# Patient Record
Sex: FEMALE | Race: WHITE | NOT HISPANIC OR LATINO | Employment: PART TIME | URBAN - METROPOLITAN AREA
[De-identification: names, ages, dates, MRNs, and addresses within clinical notes are randomized per-mention and may not be internally consistent; named-entity substitution may affect disease eponyms.]

---

## 2022-09-29 ENCOUNTER — APPOINTMENT (OUTPATIENT)
Dept: WOMENS IMAGING | Facility: HOSPITAL | Age: 67
End: 2022-09-29

## 2022-09-29 ENCOUNTER — TRANSCRIBE ORDERS (OUTPATIENT)
Dept: ADMINISTRATIVE | Facility: HOSPITAL | Age: 67
End: 2022-09-29

## 2022-09-29 DIAGNOSIS — R10.9 ABDOMINAL PAIN, UNSPECIFIED ABDOMINAL LOCATION: Primary | ICD-10-CM

## 2022-09-29 PROCEDURE — 77067 SCR MAMMO BI INCL CAD: CPT | Performed by: RADIOLOGY

## 2022-09-29 PROCEDURE — 77063 BREAST TOMOSYNTHESIS BI: CPT | Performed by: RADIOLOGY

## 2022-10-18 ENCOUNTER — HOSPITAL ENCOUNTER (OUTPATIENT)
Dept: CT IMAGING | Facility: HOSPITAL | Age: 67
Discharge: HOME | End: 2022-10-18
Admitting: OBSTETRICS & GYNECOLOGY

## 2022-10-18 DIAGNOSIS — R10.9 ABDOMINAL PAIN, UNSPECIFIED ABDOMINAL LOCATION: ICD-10-CM

## 2022-10-18 PROCEDURE — 0 DIATRIZOATE MEGLUMINE & SODIUM PER 1 ML: Performed by: OBSTETRICS & GYNECOLOGY

## 2022-10-18 PROCEDURE — 25010000002 IOPAMIDOL 61 % SOLUTION: Performed by: OBSTETRICS & GYNECOLOGY

## 2022-10-18 PROCEDURE — 82565 ASSAY OF CREATININE: CPT

## 2022-10-18 PROCEDURE — 74177 CT ABD & PELVIS W/CONTRAST: CPT

## 2022-10-18 RX ADMIN — IOPAMIDOL 100 ML: 612 INJECTION, SOLUTION INTRAVENOUS at 14:25

## 2022-10-18 RX ADMIN — DIATRIZOATE MEGLUMINE AND DIATRIZOATE SODIUM 30 ML: 660; 100 LIQUID ORAL; RECTAL at 13:15

## 2022-10-19 LAB — CREAT BLDA-MCNC: 0.6 MG/DL (ref 0.6–1.3)

## 2022-10-27 ENCOUNTER — TRANSCRIBE ORDERS (OUTPATIENT)
Dept: ADMINISTRATIVE | Facility: HOSPITAL | Age: 67
End: 2022-10-27

## 2022-10-28 ENCOUNTER — TRANSCRIBE ORDERS (OUTPATIENT)
Dept: ADMINISTRATIVE | Facility: HOSPITAL | Age: 67
End: 2022-10-28

## 2022-10-28 DIAGNOSIS — R93.5 ABNORMAL CT OF THE ABDOMEN: Primary | ICD-10-CM

## 2022-10-28 DIAGNOSIS — K86.2 CYST OF PANCREAS: ICD-10-CM

## 2022-10-28 DIAGNOSIS — C25.0 MALIGNANT NEOPLASM OF HEAD OF PANCREAS: ICD-10-CM

## 2022-10-28 DIAGNOSIS — C25.9 MALIGNANT NEOPLASM OF PANCREAS, UNSPECIFIED LOCATION OF MALIGNANCY: ICD-10-CM

## 2022-11-20 ENCOUNTER — HOSPITAL ENCOUNTER (OUTPATIENT)
Dept: MRI IMAGING | Facility: HOSPITAL | Age: 67
End: 2022-11-20

## 2022-12-05 ENCOUNTER — HOSPITAL ENCOUNTER (OUTPATIENT)
Dept: MRI IMAGING | Facility: HOSPITAL | Age: 67
Discharge: HOME | End: 2022-12-05
Admitting: INTERNAL MEDICINE

## 2022-12-05 DIAGNOSIS — C25.9 MALIGNANT NEOPLASM OF PANCREAS, UNSPECIFIED LOCATION OF MALIGNANCY: ICD-10-CM

## 2022-12-05 PROCEDURE — 0 GADOBENATE DIMEGLUMINE 529 MG/ML SOLUTION: Performed by: INTERNAL MEDICINE

## 2022-12-05 PROCEDURE — A9577 INJ MULTIHANCE: HCPCS | Performed by: INTERNAL MEDICINE

## 2022-12-05 PROCEDURE — 74183 MRI ABD W/O CNTR FLWD CNTR: CPT

## 2022-12-05 PROCEDURE — 82565 ASSAY OF CREATININE: CPT

## 2022-12-05 RX ADMIN — GADOBENATE DIMEGLUMINE 15 ML: 529 INJECTION, SOLUTION INTRAVENOUS at 12:29

## 2022-12-06 LAB — CREAT BLDA-MCNC: 0.7 MG/DL (ref 0.6–1.3)

## 2023-01-05 ENCOUNTER — OFFICE (AMBULATORY)
Dept: URBAN - METROPOLITAN AREA CLINIC 76 | Facility: CLINIC | Age: 68
End: 2023-01-05

## 2023-01-05 VITALS
WEIGHT: 181.4 LBS | OXYGEN SATURATION: 98 % | HEART RATE: 78 BPM | DIASTOLIC BLOOD PRESSURE: 70 MMHG | SYSTOLIC BLOOD PRESSURE: 130 MMHG | HEIGHT: 64 IN

## 2023-01-05 DIAGNOSIS — K80.20 CALCULUS OF GALLBLADDER WITHOUT CHOLECYSTITIS WITHOUT OBSTRU: ICD-10-CM

## 2023-01-05 DIAGNOSIS — Z86.010 PERSONAL HISTORY OF COLONIC POLYPS: ICD-10-CM

## 2023-01-05 DIAGNOSIS — K86.2 CYST OF PANCREAS: ICD-10-CM

## 2023-01-05 DIAGNOSIS — R93.3 ABNORMAL FINDINGS ON DIAGNOSTIC IMAGING OF OTHER PARTS OF DI: ICD-10-CM

## 2023-01-05 PROCEDURE — 99204 OFFICE O/P NEW MOD 45 MIN: CPT | Performed by: INTERNAL MEDICINE

## 2023-01-05 NOTE — SERVICEHPINOTES
thank you very much for referring Ms. Mcqueen for evaluation.  As you know she is a very pleasant 67-year-old white female who does have a history of polyps and is due for colonoscopy.  She also has a history of intermittent left lower quadrant pain which she says can be sharp and severe and awaken her at night.  This is been ongoing for 6 months.  When she has the pain it's go on in 30-60 seconds.  She talk to her gynecologist about this and as a result she was scheduled for CT scan and it did show multiple cysts in the pancreas as well as gallstones.  She then underwent an MRI which shows benign cyst.  There is nothing to suggest an IPM in or cyst with malignant potential and recommendations and guidelines are a repeat scan in 5 months.  I also told her that 85% of patients with gallstones don't need surgery because most gallstones are asymptomatic and even though she has stones there is no indication for cholecystectomy unless she develops symptoms. 
br
br She is not complaining of reflux.  There is no dysphagia, odynophagia nausea vomiting melena or hematemesis..
br
br   as above she has a history of polyps and it has been over 7 years since her last colonoscopy so she does need a follow-up exam.  She has had 2 previous colonoscopies that both showed polyps.  There is no family history of polyps colitis or colon cancer.

## 2023-01-05 NOTE — SERVICENOTES
records reviewed.  CT showed gallstones and multiple pancreatic cysts.  MR CP shows benign cysts of the pancreas.  No evidence of IPMN.

## 2023-04-25 VITALS
SYSTOLIC BLOOD PRESSURE: 99 MMHG | SYSTOLIC BLOOD PRESSURE: 115 MMHG | HEART RATE: 67 BPM | RESPIRATION RATE: 21 BRPM | SYSTOLIC BLOOD PRESSURE: 132 MMHG | HEART RATE: 57 BPM | HEART RATE: 62 BPM | DIASTOLIC BLOOD PRESSURE: 67 MMHG | RESPIRATION RATE: 20 BRPM | HEART RATE: 60 BPM | OXYGEN SATURATION: 95 % | RESPIRATION RATE: 13 BRPM | OXYGEN SATURATION: 98 % | WEIGHT: 191 LBS | TEMPERATURE: 97 F | TEMPERATURE: 97.2 F | HEIGHT: 64 IN | RESPIRATION RATE: 17 BRPM | OXYGEN SATURATION: 99 % | SYSTOLIC BLOOD PRESSURE: 111 MMHG | HEART RATE: 64 BPM | HEART RATE: 65 BPM | HEART RATE: 59 BPM | RESPIRATION RATE: 19 BRPM | OXYGEN SATURATION: 96 % | SYSTOLIC BLOOD PRESSURE: 139 MMHG | RESPIRATION RATE: 9 BRPM | SYSTOLIC BLOOD PRESSURE: 131 MMHG | HEART RATE: 68 BPM | DIASTOLIC BLOOD PRESSURE: 64 MMHG | DIASTOLIC BLOOD PRESSURE: 60 MMHG | SYSTOLIC BLOOD PRESSURE: 110 MMHG | HEART RATE: 63 BPM | SYSTOLIC BLOOD PRESSURE: 130 MMHG | RESPIRATION RATE: 16 BRPM | DIASTOLIC BLOOD PRESSURE: 53 MMHG | RESPIRATION RATE: 14 BRPM | SYSTOLIC BLOOD PRESSURE: 101 MMHG | DIASTOLIC BLOOD PRESSURE: 77 MMHG | DIASTOLIC BLOOD PRESSURE: 55 MMHG | DIASTOLIC BLOOD PRESSURE: 61 MMHG | SYSTOLIC BLOOD PRESSURE: 127 MMHG | DIASTOLIC BLOOD PRESSURE: 59 MMHG

## 2023-04-28 ENCOUNTER — OFFICE (AMBULATORY)
Dept: URBAN - METROPOLITAN AREA PATHOLOGY 4 | Facility: PATHOLOGY | Age: 68
End: 2023-04-28

## 2023-04-28 ENCOUNTER — AMBULATORY SURGICAL CENTER (AMBULATORY)
Dept: URBAN - METROPOLITAN AREA SURGERY 17 | Facility: SURGERY | Age: 68
End: 2023-04-28

## 2023-04-28 DIAGNOSIS — K63.5 POLYP OF COLON: ICD-10-CM

## 2023-04-28 DIAGNOSIS — Z86.010 PERSONAL HISTORY OF COLONIC POLYPS: ICD-10-CM

## 2023-04-28 LAB
GI HISTOLOGY: A. SELECT: (no result)
GI HISTOLOGY: PDF REPORT: (no result)

## 2023-04-28 PROCEDURE — 88305 TISSUE EXAM BY PATHOLOGIST: CPT | Performed by: INTERNAL MEDICINE

## 2023-04-28 PROCEDURE — 45385 COLONOSCOPY W/LESION REMOVAL: CPT | Mod: PT | Performed by: INTERNAL MEDICINE

## 2023-04-28 NOTE — SERVICEHPINOTES
thank you very much for referring Ms. Mcqueen for evaluation.  As you know she is a very pleasant 67-year-old white female who does have a history of polyps and is due for colonoscopy.  She also has a history of intermittent left lower quadrant pain which she says can be sharp and severe and awaken her at night.  This is been ongoing for 6 months.  When she has the pain it's go on in 30-60 seconds.  She talk to her gynecologist about this and as a result she was scheduled for CT scan and it did show multiple cysts in the pancreas as well as gallstones.  She then underwent an MRI which shows benign cyst.  There is nothing to suggest an IPM in or cyst with malignant potential and recommendations and guidelines are a repeat scan in 5 months.  I also told her that 85% of patients with gallstones don't need surgery because most gallstones are asymptomatic and even though she has stones there is no indication for cholecystectomy unless she develops symptoms.She is not complaining of reflux.  There is no dysphagia, odynophagia nausea vomiting melena or hematemesis..as above she has a history of polyps and it has been over 7 years since her last colonoscopy so she does need a follow-up exam.  She has had 2 previous colonoscopies that both showed polyps.  There is no family history of polyps colitis or colon cancer.

## 2025-03-12 ENCOUNTER — OFFICE (AMBULATORY)
Dept: URBAN - METROPOLITAN AREA CLINIC 76 | Facility: CLINIC | Age: 70
End: 2025-03-12
Payer: MEDICARE

## 2025-03-12 VITALS
WEIGHT: 208 LBS | DIASTOLIC BLOOD PRESSURE: 82 MMHG | HEIGHT: 64 IN | HEART RATE: 80 BPM | SYSTOLIC BLOOD PRESSURE: 140 MMHG | OXYGEN SATURATION: 96 %

## 2025-03-12 DIAGNOSIS — K80.20 CALCULUS OF GALLBLADDER WITHOUT CHOLECYSTITIS WITHOUT OBSTRU: ICD-10-CM

## 2025-03-12 DIAGNOSIS — Z86.0102 PERSONAL HISTORY OF HYPERPLASTIC COLON POLYPS: ICD-10-CM

## 2025-03-12 DIAGNOSIS — R93.3 ABNORMAL FINDINGS ON DIAGNOSTIC IMAGING OF OTHER PARTS OF DI: ICD-10-CM

## 2025-03-12 DIAGNOSIS — K86.2 CYST OF PANCREAS: ICD-10-CM

## 2025-03-12 PROBLEM — K63.5 POLYP OF COLON: Status: ACTIVE | Noted: 2023-04-28

## 2025-03-12 PROCEDURE — 99213 OFFICE O/P EST LOW 20 MIN: CPT | Performed by: INTERNAL MEDICINE

## 2025-03-13 ENCOUNTER — TRANSCRIBE ORDERS (OUTPATIENT)
Dept: ADMINISTRATIVE | Facility: HOSPITAL | Age: 70
End: 2025-03-13
Payer: MEDICARE

## 2025-03-14 ENCOUNTER — TRANSCRIBE ORDERS (OUTPATIENT)
Dept: ADMINISTRATIVE | Facility: HOSPITAL | Age: 70
End: 2025-03-14
Payer: MEDICARE

## 2025-03-14 DIAGNOSIS — K80.20 GALLSTONES: ICD-10-CM

## 2025-03-14 DIAGNOSIS — R93.3 ABNORMAL FINDINGS ON DIAGNOSTIC IMAGING OF OTHER PARTS OF DIGESTIVE TRACT: Primary | ICD-10-CM

## 2025-03-14 DIAGNOSIS — K86.2 PANCREATIC CYST: ICD-10-CM

## 2025-03-14 DIAGNOSIS — K63.5 POLYP OF COLON, UNSPECIFIED PART OF COLON, UNSPECIFIED TYPE: ICD-10-CM

## 2025-03-14 DIAGNOSIS — Z86.0109 PERSONAL HISTORY OF OTHER COLON POLYPS: ICD-10-CM

## 2025-04-24 ENCOUNTER — HOSPITAL ENCOUNTER (OUTPATIENT)
Dept: MRI IMAGING | Facility: HOSPITAL | Age: 70
Discharge: HOME | End: 2025-04-24
Admitting: INTERNAL MEDICINE
Payer: MEDICARE

## 2025-04-24 DIAGNOSIS — K86.2 PANCREATIC CYST: ICD-10-CM

## 2025-04-24 DIAGNOSIS — Z86.0109 PERSONAL HISTORY OF OTHER COLON POLYPS: ICD-10-CM

## 2025-04-24 DIAGNOSIS — K80.20 GALLSTONES: ICD-10-CM

## 2025-04-24 DIAGNOSIS — R93.3 ABNORMAL FINDINGS ON DIAGNOSTIC IMAGING OF OTHER PARTS OF DIGESTIVE TRACT: ICD-10-CM

## 2025-04-24 DIAGNOSIS — K63.5 POLYP OF COLON, UNSPECIFIED PART OF COLON, UNSPECIFIED TYPE: ICD-10-CM

## 2025-04-24 PROCEDURE — A9577 INJ MULTIHANCE: HCPCS | Performed by: INTERNAL MEDICINE

## 2025-04-24 PROCEDURE — 74183 MRI ABD W/O CNTR FLWD CNTR: CPT

## 2025-04-24 PROCEDURE — 25510000002 GADOBENATE DIMEGLUMINE 529 MG/ML SOLUTION: Performed by: INTERNAL MEDICINE

## 2025-04-24 RX ADMIN — GADOBENATE DIMEGLUMINE 19 ML: 529 INJECTION, SOLUTION INTRAVENOUS at 08:29
